# Patient Record
Sex: MALE | Race: BLACK OR AFRICAN AMERICAN
[De-identification: names, ages, dates, MRNs, and addresses within clinical notes are randomized per-mention and may not be internally consistent; named-entity substitution may affect disease eponyms.]

---
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
## 2023-02-27 ENCOUNTER — HOSPITAL ENCOUNTER (OUTPATIENT)
Dept: HOSPITAL 76 - DI | Age: 23
Discharge: HOME | End: 2023-02-27
Attending: FAMILY MEDICINE
Payer: SELF-PAY

## 2023-02-27 DIAGNOSIS — M22.42: ICD-10-CM

## 2023-02-27 DIAGNOSIS — M25.462: ICD-10-CM

## 2023-02-27 DIAGNOSIS — S83.412A: ICD-10-CM

## 2023-02-27 DIAGNOSIS — S83.242A: Primary | ICD-10-CM

## 2023-02-27 NOTE — MRI REPORT
PROCEDURE:  KNEE WO - LT

 

INDICATIONS:  CHRONIC INSTABILITY OF LEFT KNEE

 

TECHNIQUE:  

Noncontrast sagittal PD fast spin echo and T2 fast spin echo with fat saturation, sagittal 3-D gradie
nt sequence with fat saturation; coronal T1 spin echo and PD fast spin echo with fat saturation, and 
axial PD fast spin echo with fat saturation through the knee.  

 

COMPARISON:  None.

 

FINDINGS:  

Image quality:  Excellent.  

 

Menisci: Oblique tear involving posterior horn of medial meniscus is seen extending to inferior artic
ulating surface. There is no focal lateral meniscal tear. The meniscal root ligaments appear intact. 
 

 

Cruciate ligaments:  The anterior and posterior cruciate ligaments appear intact.  

 

Medial structures:  The medial collateral ligament appears thickened The posterior oblique ligament, 
semimembranosus tendon insertions, and oblique popliteal ligament, and meniscocapsular junction appea
r intact.  Visualized portions of the pes anserinus tendons appear normal.  No abnormal bursal fluid.
  

 

Lateral structures:  The lateral collateral ligament, long and short heads of the biceps femoris tend
on appear intact.  The popliteus tendon appears normal; the popliteofibular ligament appears intact. 
liotibial band appears normal.  

 

Anterior structures:  The quadriceps and patellar tendons appear intact.  Patellar alignment is donald
l.  No femoral trochlear dysplasia or ventral trochlear prominence.  No edema in the infrapatellar fa
t pad.  

 

Bones and cartilage:  No bone marrow contusions or fractures.  The cartilage of the medial and latera
l femorotibial compartments appears normal in thickness. Low to moderate grade chondromalacia involvi
ng patella cartilage is seen.

 

Joint space:  There is small knee joint fluid.  No Baker's cyst.  Normal appearing synovial plicae ar
e incidentally noted.  

 

IMPRESSION:

1.Oblique tear involving posterior horn of medial meniscus extending to inferior articulating surface
. No focal lateral meniscal tear.

2. The cruciate ligaments are intact. Low-grade MCL sprain.

3. 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 No fracture or dislocation. No marrow edema. Small a
mount of joint fluid, no loose bodies.

 

Reviewed by: Byron Montoya MD on 2/27/2023 2:17 PM PST

Approved by: Byron Montoya MD on 2/27/2023 2:17 PM PST

 

 

Station ID:  [id number]